# Patient Record
Sex: FEMALE | Race: WHITE | Employment: OTHER | URBAN - METROPOLITAN AREA
[De-identification: names, ages, dates, MRNs, and addresses within clinical notes are randomized per-mention and may not be internally consistent; named-entity substitution may affect disease eponyms.]

---

## 2018-03-25 ENCOUNTER — APPOINTMENT (OUTPATIENT)
Dept: GENERAL RADIOLOGY | Facility: HOSPITAL | Age: 83
End: 2018-03-25
Attending: EMERGENCY MEDICINE
Payer: MEDICARE

## 2018-03-25 ENCOUNTER — HOSPITAL ENCOUNTER (EMERGENCY)
Facility: HOSPITAL | Age: 83
Discharge: HOME | End: 2018-03-25
Attending: EMERGENCY MEDICINE
Payer: MEDICARE

## 2018-03-25 VITALS
RESPIRATION RATE: 16 BRPM | HEART RATE: 71 BPM | WEIGHT: 190 LBS | TEMPERATURE: 98 F | OXYGEN SATURATION: 98 % | SYSTOLIC BLOOD PRESSURE: 165 MMHG | BODY MASS INDEX: 37.3 KG/M2 | HEIGHT: 60 IN | DIASTOLIC BLOOD PRESSURE: 7 MMHG

## 2018-03-25 DIAGNOSIS — S80.02XA CONTUSION OF LEFT KNEE, INITIAL ENCOUNTER: ICD-10-CM

## 2018-03-25 DIAGNOSIS — S80.02XA TRAUMATIC HEMATOMA OF LEFT KNEE, INITIAL ENCOUNTER: Primary | ICD-10-CM

## 2018-03-25 DIAGNOSIS — L03.116 CELLULITIS OF LEFT LOWER EXTREMITY: ICD-10-CM

## 2018-03-25 PROCEDURE — 99283 EMERGENCY DEPT VISIT LOW MDM: CPT

## 2018-03-25 PROCEDURE — 73560 X-RAY EXAM OF KNEE 1 OR 2: CPT | Performed by: EMERGENCY MEDICINE

## 2018-03-25 PROCEDURE — 10160 PNXR ASPIR ABSC HMTMA BULLA: CPT

## 2018-03-25 RX ORDER — FUROSEMIDE 20 MG/1
20 TABLET ORAL 2 TIMES DAILY
Qty: 20 TABLET | Refills: 0 | Status: SHIPPED | OUTPATIENT
Start: 2018-03-25 | End: 2018-04-04

## 2018-03-25 RX ORDER — MELOXICAM 15 MG/1
15 TABLET ORAL DAILY
Qty: 15 TABLET | Refills: 0 | Status: SHIPPED | OUTPATIENT
Start: 2018-03-25 | End: 2018-03-25

## 2018-03-25 RX ORDER — CEPHALEXIN 500 MG/1
500 CAPSULE ORAL 3 TIMES DAILY
Qty: 30 CAPSULE | Refills: 0 | Status: SHIPPED | OUTPATIENT
Start: 2018-03-25 | End: 2018-04-04

## 2018-03-26 NOTE — ED PROVIDER NOTES
Patient Seen in: [facility] AND [facility] Emergency Department    History   Patient presents with:  Musculoskeletal Problem      HPI    Patient presents to the ED after falling onto her left knee 2 weeks ago.   She had significant bruising after the fall but sta Neurological: She is alert. Coordination normal.   Skin: Skin is warm and dry. Ecchymosis along the medial and lateral left foot. Resolving ecchymosis to the anterior left knee, large area, roughly 4×6 cm of fluctuant skin that is nontender.   4×6 cm a weeks ago. Ongoing skin changes to the anterior left knee, concern for resolving hematoma versus abscess versus cellulitis. Needle aspiration performed in the patient's area of fluctuance and clear serosanguineous fluid returned.   Low concern for infecti

## 2018-03-26 NOTE — ED NOTES
Pt reports to ED with complaints of new redness and swelling in her left knee. Pt had fall from standing onto her L knee two weeks ago.  Pt reports the knee initially bruised but pt was able to ambulate, medicating with advil at home, reporting that she nev

## 2018-03-30 ENCOUNTER — HOSPITAL ENCOUNTER (EMERGENCY)
Facility: HOSPITAL | Age: 83
Discharge: HOME | End: 2018-03-30
Attending: EMERGENCY MEDICINE
Payer: MEDICARE

## 2018-03-30 VITALS
SYSTOLIC BLOOD PRESSURE: 143 MMHG | OXYGEN SATURATION: 97 % | BODY MASS INDEX: 36.32 KG/M2 | DIASTOLIC BLOOD PRESSURE: 71 MMHG | HEART RATE: 65 BPM | HEIGHT: 60 IN | RESPIRATION RATE: 20 BRPM | TEMPERATURE: 99 F | WEIGHT: 185 LBS

## 2018-03-30 DIAGNOSIS — M70.52 SUPRAPATELLAR BURSITIS OF LEFT KNEE: ICD-10-CM

## 2018-03-30 DIAGNOSIS — S80.02XD CONTUSION OF LEFT KNEE, SUBSEQUENT ENCOUNTER: Primary | ICD-10-CM

## 2018-03-30 PROCEDURE — 99283 EMERGENCY DEPT VISIT LOW MDM: CPT

## 2018-03-30 PROCEDURE — 20610 DRAIN/INJ JOINT/BURSA W/O US: CPT

## 2018-03-30 RX ORDER — LIDOCAINE HYDROCHLORIDE 10 MG/ML
10 INJECTION, SOLUTION EPIDURAL; INFILTRATION; INTRACAUDAL; PERINEURAL ONCE
Status: COMPLETED | OUTPATIENT
Start: 2018-03-30 | End: 2018-03-30

## 2018-03-30 RX ORDER — LIDOCAINE HYDROCHLORIDE 10 MG/ML
INJECTION, SOLUTION EPIDURAL; INFILTRATION; INTRACAUDAL; PERINEURAL
Status: COMPLETED
Start: 2018-03-30 | End: 2018-03-30

## 2018-03-31 NOTE — ED PROVIDER NOTES
Patient Seen in: [facility] AND [facility] Emergency Department    History   Patient presents with:  Lower Extremity Injury (musculoskeletal)      HPI    Patient presents to the ED after a fall 6 days ago.   Patient landed on her left knee and has had swelling in edema from prior visit. There is evidence for contusions and ecchymosis to the left anterior knee with an area of induration to the lower knee that is nontender and area of fluctuance to the superior anterior knee area of  prior fluid collection.   Trace e stable for discharge, PMD follow-up when she returns to [place]. Procedure:  Left anterior knee aspiration. Verbal consent from patient is clean risk and benefits. Sterile technique. Anesthesia with 1% Xylocaine.   Fluid pocket present to the upper a

## 2018-03-31 NOTE — ED NOTES
79yo F arrives to ER with c/o swelling, pain and redness to left knee. Patient denies drainage. States seen on Sunday for same, had knee tapped and started on abx. Denies new trauma or injury to knee beyond what seen previously for. CMS intact.  MD currentl

## 2018-03-31 NOTE — ED NOTES
Patient provided with discharge instructions and follow up information. Patient verbalized understanding of instructions without any questions. Patient ambulatory out of ER with family with steady gait.

## (undated) NOTE — ED AVS SNAPSHOT
Collins Carcamo   MRN: [id number]    Department:  [facility] Emergency Department   Date of Visit:  3/25/2018           Disclosure     Insurance plans vary and the physician(s) referred by the ER may not be covered by your plan.  Please contact within the next three months to obtain basic health screening including reassessment of your blood pressure.     IF THERE IS ANY CHANGE OR WORSENING OF YOUR CONDITION, CALL YOUR PRIMARY CARE PHYSICIAN AT ONCE OR RETURN IMMEDIATELY TO THE EMERGENCY DEPARTMEN

## (undated) NOTE — ED AVS SNAPSHOT
Nita Burris   MRN: [id number]    Department:  [facility] Emergency Department   Date of Visit:  3/30/2018           Disclosure     Insurance plans vary and the physician(s) referred by the ER may not be covered by your plan.  Please contact within the next three months to obtain basic health screening including reassessment of your blood pressure.     IF THERE IS ANY CHANGE OR WORSENING OF YOUR CONDITION, CALL YOUR PRIMARY CARE PHYSICIAN AT ONCE OR RETURN IMMEDIATELY TO THE EMERGENCY DEPARTMEN